# Patient Record
Sex: FEMALE | Race: WHITE | URBAN - METROPOLITAN AREA
[De-identification: names, ages, dates, MRNs, and addresses within clinical notes are randomized per-mention and may not be internally consistent; named-entity substitution may affect disease eponyms.]

---

## 2018-02-13 ENCOUNTER — HOSPITAL ENCOUNTER (OUTPATIENT)
Dept: OTHER | Age: 28
Discharge: HOME | End: 2018-02-13
Attending: PEDIATRICS | Admitting: PEDIATRICS

## 2018-02-13 LAB
EKG ATRIAL RATE: 66 BPM
EKG DIAGNOSIS: NORMAL
EKG P AXIS: 66 DEGREES
EKG P-R INTERVAL: 138 MS
EKG Q-T INTERVAL: 406 MS
EKG QRS DURATION: 92 MS
EKG QTC CALCULATION (BAZETT): 425 MS
EKG R AXIS: 9 DEGREES
EKG T AXIS: 45 DEGREES
EKG VENTRICULAR RATE: 66 BPM

## 2020-03-19 ENCOUNTER — TELEPHONE (OUTPATIENT)
Dept: PRIMARY CARE CLINIC | Age: 30
End: 2020-03-19

## 2020-03-19 RX ORDER — AZITHROMYCIN 250 MG/1
250 TABLET, FILM COATED ORAL SEE ADMIN INSTRUCTIONS
Qty: 6 TABLET | Refills: 0 | Status: SHIPPED | OUTPATIENT
Start: 2020-03-19 | End: 2020-03-24

## 2020-03-19 NOTE — Clinical Note
Please contact PCP of this patient Alton Collins MD) to let her know that she is being treated for B parapertussis (son was positive and mom has symptoms)

## 2020-03-19 NOTE — PROGRESS NOTES
Spoke with Nurse Leonora Duncan at [facility] (Dr. Jazmyne Stafford; [phone]); message left for Dr. Khadra Sosa her that the son of her patient Kimberly Baron tested positive for Bordetella parapertussis, confirmed by PCR. Unk Chamber has also been coughing a lot. Plan is to treat mom with Azithromycin x 5 day course, and mom is to continue with isolation until 24 hours after meds are finished.

## 2020-03-19 NOTE — TELEPHONE ENCOUNTER
Spoke to Ms Hurtadois Mcardle regarding test results for Miguel's (her son) (positive parappertussis) pertussis results.  She is aware that  will send medication to pharmacy

## 2020-03-19 NOTE — PROGRESS NOTES
Son has Bordetella parapertussis, confirmed by PCR. Mom has also been coughing a lot.   Plan: azithromycin x 5 day course, continue isolation until 24 hours after meds are finished  Confirmed with mom no allergies except hydrocodone-acetaminophen